# Patient Record
Sex: MALE | Race: WHITE | Employment: OTHER | URBAN - METROPOLITAN AREA
[De-identification: names, ages, dates, MRNs, and addresses within clinical notes are randomized per-mention and may not be internally consistent; named-entity substitution may affect disease eponyms.]

---

## 2019-01-15 ENCOUNTER — HOSPITAL ENCOUNTER (OUTPATIENT)
Dept: GENERAL RADIOLOGY | Age: 84
Discharge: HOME | End: 2019-01-15
Payer: MEDICARE

## 2019-01-15 DIAGNOSIS — R19.12 INCREASED BOWEL SOUNDS: ICD-10-CM

## 2019-01-15 PROCEDURE — 74018 RADEX ABDOMEN 1 VIEW: CPT

## 2019-03-25 ENCOUNTER — TELEPHONE (OUTPATIENT)
Dept: DERMATOLOGY | Facility: AMBULATORY SURGERY CENTER | Age: 84
End: 2019-03-25

## 2019-03-26 NOTE — TELEPHONE ENCOUNTER
Made phone call to patient, left voicemail stating to call office back at earliest convenience to speak about patients upcoming MOHS procedure.

## 2019-03-27 NOTE — TELEPHONE ENCOUNTER
Spoke to [employer], patient's wife, MARY verified. [facility] [place] [facility]    Patient Appointment Date: 4/10/19 @ 0900    Loretta Hammer, 82 y.o., male      does confirm site: Right nasal sidewall-BCC  Brief description of tumor: Red and crusty  does ID site. (Can they still visibly see the site)  does not have Hepatitis C   does not have HIV (If YES, set up consult appointment)    Allergies: Allergies   Allergen Reactions    Peanut Other (comments)     Lip tingling       does not have an Electrical Implanted Device (Pacemaker, AICD, brain stimulator, etc.)    does need antibiotics  If yes, antibiotic used:Amoxicillin 500 mg; and needs it because blood infection (valve replacement, etc.)  Can patient get antibiotic from primary care provider YES    is not taking NSAIDs  If yes, is taking N/A, and was asked to d/c 2 days prior to surgery and informed to resume taking 2 days after surgery. Patient can switch to a Tylenol based medication. is taking aspirin  If yes, is taking because of PREVENTATIVE (i.e. heart attack, stroke, TIA, bypass surgery, etc.)    is not taking Garlic  is not taking Ginkgo  is not taking Ginseng  is taking Fish oils  is taking Vit E    does not take a blood thinner(i.e. Coumadin/Warfarin, Plavix, Brilinta, Pradaxa, Xarelto, Effient, Eliquis, Savaysa)  If taking Coumadin needs to have PT/INR drawn and faxed results within a week of surgery    does not have a blood disorder (CLL, AML, PV)  is not on Meds for blood disorder, pt on:  is not on Chemo for blood disorder    has not had a organ transplant  has not had a bone marrow transplant      Pre operative assessment questions asked to patient. Patient has a general understanding of the procedure, and has been versed that there will be local anesthesia used in the procedure and that He will be ok to drive themselves to and from the appointment.      Patient has been notified to arrive 15-20 minutes early and they may eat or drink before arriving.

## 2019-03-27 NOTE — TELEPHONE ENCOUNTER
Pt's wife returned 's call for pre-op. She stated her  can not hear well and she would be able to answer any questions.  She can be best reached at [phone]

## 2019-04-10 ENCOUNTER — OFFICE VISIT (OUTPATIENT)
Dept: DERMATOLOGY | Facility: AMBULATORY SURGERY CENTER | Age: 84
End: 2019-04-10

## 2019-04-10 VITALS
OXYGEN SATURATION: 98 % | TEMPERATURE: 97.9 F | SYSTOLIC BLOOD PRESSURE: 118 MMHG | WEIGHT: 150 LBS | BODY MASS INDEX: 22.73 KG/M2 | DIASTOLIC BLOOD PRESSURE: 84 MMHG | HEIGHT: 68 IN | HEART RATE: 58 BPM

## 2019-04-10 DIAGNOSIS — C44.311 BASAL CELL CARCINOMA (BCC) OF RIGHT SIDE OF NOSE: Primary | ICD-10-CM

## 2019-04-10 NOTE — LETTER
4/10/2019 1:03 PM 
 
Patient:  Rama Meza YOB: 1935 Date of Visit: 4/10/2019 Dear Sushil Coleman MD 
[address] [facility] [address] [facility] [id number] VIA Facsimile: [phone] 
 : Thank you for referring Mr. Kelly Antony to me for evaluation/treatment. Below are the relevant portions of my assessment and plan of care. Mr. Kailee Billy presented this morning for Mohs surgery to treat a biopsy-proven basal cell carcinoma of the right nasal sidewall. 2 stages of Mohs surgery were required to achieve tumor free margins. I repaired the defect using an island pedicle flap in order to maintain the alar groove. The patient tolerated the procedure well. See the attached procedural notes for additional details. He will return in 1 week for suture removal and I will follow-up with him regarding any issues arising from relating to the surgery. I will otherwise defer any additional dermatologic care back to you. If you have questions, please do not hesitate to call me. I look forward to following Mr. Kailee Billy along with you. Sincerely, Sarah Rdz MD 
[phone] (cell)

## 2019-04-10 NOTE — PROGRESS NOTES
Rama Meza is a 82 y.o. male who is seen post-Mohs surgery to evaluate:  Chief Complaint:  Wound resulting from surgical extirpation of a skin cancer. HPI: Biopsy-proven basal cell carcinoma on the right nasal sidewall s/p 2 stages of Mohs surgery resulting in tumor-free margins. The size of the defect is 0.9 x 0.8 cm. The site is bleeding and is not painful. The tumor was removed on 4/10/2019. Outpatient Encounter Medications as of 4/10/2019   Medication Sig Dispense Refill    aspirin (ASPIRIN) 325 mg tablet Take 325 mg by mouth daily.  atorvastatin (LIPITOR) 10 mg tablet Take  by mouth daily.  zolpidem (AMBIEN) 10 mg tablet Take 10 mg by mouth as needed. 5    cholecalciferol, vitamin D3, 2,000 unit tab Take  by mouth.  CALCIUM-MAGNESIUM PO Take  by mouth.  COQ10, UBIQUINOL, PO Take  by mouth.  omeprazole (PRILOSEC) 10 mg capsule Take 10 mg by mouth daily.  LORAZEPAM (ATIVAN PO) Take  by mouth.  ERGOCALCIFEROL, VITAMIN D2, (VITAMIN D2 PO) Take  by mouth.  vitamin e (E GEMS) 1,000 unit capsule Take 1,000 Units by mouth daily. No facility-administered encounter medications on file as of 4/10/2019. Allergies   Allergen Reactions    Peanut Other (comments)     Lip tingling       Social History     Tobacco Use   Smoking Status Never Smoker   Smokeless Tobacco Never Used       Physical Exam   Constitutional: He is oriented to person, place, and time and well-developed, well-nourished, and in no distress. HENT:   Head: Normocephalic. Eyes: EOM are normal.   Pulmonary/Chest: Effort normal.   Neurological: He is alert and oriented to person, place, and time. Skin:   On the right nasal sidewall just superior to the alar groove there is a 0.9 x 0.8 cm surgical defect extending to the nasalis muscle. Laxity exists on the superior nasal sidewall and cheek lateral to the defect.   Primary closure will result in an inferior [place] triangle obliterating the alar groove. Evaluation and Management:  There are symptomatic surgical defects as noted above. Treatment options discussed with the patient include second intent healing, primary closure, adjacent tissue transfer (flap), and skin graft (full or split thickness) without cartilage grafting. Following evaluation of the defect(s) and discussion with the patient regarding risks and benefits of the various options, the plan to repair the defect with a(n) advancement flap was chosen. This repair was chosen in order to preserve anatomic form or function and avoid free margin distortion. The wound management options of second intent healing, layered closure, local flap, or full thickness skin graft were discussed. Mr. Macy Rendon understands the aims, risks, alternatives, and possible complications and elects to proceed with an island pedicle advancement flap. This flap was chosen in order to avoid displacement of the alar rim and to avoid obliteration of the alar groove. Mr. Macy Rendon was placed in a supine position on the operating table in the Mohs surgery procedure room. The area was prepped and draped in the standard manner. Gentian violet was used to outline the proposed flap.  1% lidocaine with epinephrine 1:100,000 was used to supplement the already existing anesthesia. The wound penetrated to the muscle layer and measured 0.9 x 0.8 cm. The wound margins were debeveled and the flap was incised using a #15 blade held perpendicularly to the skin surface. Subcutaneous tissue lateral to the flap was undermined in the subcutaneous plane. Leading edge and trailing edges were  from nasalis muscle while maintaining a central musculocutaneous pedicle. Hemostasis was obtained with spot electrocoagulation. The flap was advanced into place. 5-0 monocryl buried vertical mattress sutures were used to approximate the deep tissues and dermis.  6-0 prolene epidermal sutures were used to approximate the skin edges with careful attention to apposition and eversion. The final flap measured 3.2 x 1.5 cm. A pressure dressing utilizing Petrolatum ointment, Telfa, gauze and Coverroll was placed. Wound care instructions (written and/or verbal) and a follow up appointment were given to the patient before discharge. Mr. Anthony Huizar was discharged in good condition. Follow-up and Dispositions    · Return in about 1 week (around 4/17/2019) for suture removal with Erica . Stacy Figueroa MD    [facility]   OFFICE PROCEDURE PROGRESS NOTE   Chart reviewed for the following:   I, Emory Braxton MD have reviewed the History, Physical and updated the Allergic reactions for Linden Ventura. TIME OUT performed immediately prior to start of procedure:   Alma Ramirez MD, have performed the following reviews on Linden Ventura   prior to the start of the procedure:     * Patient was identified by name and date of birth   * Agreement on procedure being performed was verified   * Risks and Benefits explained to the patient   * Procedure site verified and marked as necessary   * Patient was positioned for comfort   * Consent was signed and verified     Time: 9 AM  Date of procedure: 4/10/2019  Procedure performed by:  Emory Braxton MD  Provider assisted by: Daisy Jauregui LPN  Patient assisted by: self   How tolerated by patient: tolerated the procedure well with no complications   Comments: none

## 2019-04-10 NOTE — PROGRESS NOTES
Progress note for Mohs surgery patient:    Chief Complaint:  Type: Basal Cell Carcinoma cell carcinoma of the Location: Right nasal sidewall    HPI:  Terry Linton is a 82y.o. year old male referred by  Sy Mccormick MD for evaluation and management of a Type: Basal Cell Carcinoma cell carcinoma of the Location: Right nasal sidewall. The skin cancer has been present for 6 months and has not been previously treated. The symptoms of this skin cancer were none. A biopsy performed by  Sy Mccormick MD confirmed the diagnosis. ROS:  Loren Herrera is feeling well and in their usual state of health today. He is not in pain. He does not have any other skin concerns. Exam:  Loren Herrera is an awake, alert, oriented, well-appearing Male in no distress. There is not preauricular, submandibular, or cervical lymphadenopathy. The face was examined. Findings are:   Lesion Info  Location: Right nasal sidewall  Size: 0.7 x 0.7 cm  Type: Basal Cell Carcinoma  Duration: 6 months  Path Lab: [facility]  Path #: [id number]  Prior Treatment: none     A/P:  Type: Basal Cell Carcinoma cell carcinoma of the Location: Right nasal sidewall. The diagnosis was reviewed. The Mohs surgery procedure was reviewed. Indications, risks, and options were discussed with Mr. SAMUELS [facility] [place] [facility] preoperatively. Risks including, but not limited to: pain, bleeding, infection, tumor recurrence, scarring and damage to motor and/or sensory nerves, were discussed.  [facility] [place] [facility] chose Mohs surgery.  [facility] [place] [facility] was an acceptable surgery candidate. Mohs surgery is indicated by Indications: Site, Poor definition. I performed Mohs surgery after verbal and written consent were obtained. The surgical site was marked with gentian violet, prepped, draped and anesthetized in standard fashion. The tumor was debulked by curettage and orientation hashes were placed.   The tumor and any associated scar was excised using beveled incision, hemostasis was achieved, the site was bandaged, and the tissue was transported to the Mohs lab. While maintaining anatomic orientation the tissue was divided if needed and marked with colored inks that were noted on the corresponding Mohs map. The tissue was prepared by Mohs en-face technique for fresh frozen section analysis. The resulting slides were examined for residual tumor, scar and other concerns, all of which were marked on the corresponding Mohs map, if present. The Mohs map was used to guide subsequent stages of surgery, if necessary, and the above process was repeated until a tumor-free plane was achieved. Once the tumor was cleared the map was marked as such and signed. Dr. Nicol Silveira acted as surgeon and pathologist for the entire case, performing all stages of the surgical excision as well as examination and interpretation of the histologic slides. 2 stages were required to reach a tumor-free plane, resulting in a Defect Size: 0.9 x 0.8 cm extending to the nasalis muscle. There were not complications. Lena Boston will follow up as needed in the postoperative period. Regular skin examinations will be with  Mariam Bhardwaj MD.    See reconstructive note for repair details. [facility]   OFFICE PROCEDURE PROGRESS NOTE   Chart reviewed for the following:   IAdam MD have reviewed the History, Physical and updated the Allergic reactions for Bernard Lugo.     TIME OUT performed immediately prior to start of procedure:   Zoe Wood MD, have performed the following reviews on Bernard Lugo   prior to the start of the procedure:     * Patient was identified by name and date of birth   * Agreement on procedure being performed was verified   * Risks and Benefits explained to the patient   * Procedure site verified and marked as necessary   * Patient was positioned for comfort   * Consent was signed and verified     Time: 9 AM  Date of procedure: 4/10/2019  Procedure performed by:  Quentin Laughlin MD  Provider assisted by: Ingrid Martins LPN  Patient assisted by: self   How tolerated by patient: tolerated the procedure well with no complications   Comments: none

## 2019-04-10 NOTE — PROGRESS NOTES
Chief Complaint   Patient presents with    New Patient    Basal Cell Carcinoma     Mohs-Right nasal sidewall     Pre-op: Patient presents today for the evaluation of basal cell carcinoma to the right nasal sidewall. Procedure explained with full understanding. Visit Vitals  /72 (BP 1 Location: Left arm, BP Patient Position: Sitting)   Pulse 65   Temp 97.9 °F (36.6 °C) (Oral)   Ht 5' 8\" (1.727 m)   Wt 68 kg (150 lb)   SpO2 97%   BMI 22.81 kg/m²       Preoperatively, will continue to monitor. Post-op: Written and verbal post-op wound care instructions given to patient with full understanding of care. Surgical wound bandaged with Vaseline, Telfa, 2x2 gauze, and coverall tape. All questions and concerns addressed. Vitals stable postoperatively.

## 2019-04-10 NOTE — PATIENT INSTRUCTIONS
WOUND CARE INSTRUCTIONS    1. Keep the dressing clean and dry and do not remove for 48 hours. 2. Then change the dressing once a day as follows:  a. Wash hands before and after each dressing change. b. Remove dressing and wash site gently with mild soap and water, rinse, and pat dry.  c. Apply an ointment (Bacitracin, Polysporin, Neosporin, Petroleum jelly or Aquaphor). d. Apply a non-stick (Telfa) dressing or Band-Aid to cover the wound. 3. Watch for:  BLEEDING: A small amount of drainage may occur. If bleeding occurs, elevate and rest the surgery site. Apply gauze and steady pressure for 15 minutes. If bleeding continues, call this office. INFECTION: Signs of infection include increased redness, pain, warmth, drainage of pus, and fever. If this occurs, call this office. 4. Special Instructions (follow any that are checked):  · [x] You have stitches that DO need to be removed. · [x] Avoid bending at the waist and heavy lifting for two days. · [x] Sleep with your head elevated for the next two nights. · [x] Rest the surgery site and keep it elevated as much as possible for two days. · [x] You may apply an ice-pack for 10-15 minutes every waking hour for the rest of the day. · [] Eat a soft diet and avoid hot food and hot drinks for the rest of the day. · [] Other instructions: Follow up as directed. Take Tylenol or Ibuprofen for pain as needed. Once the site is healed with no remaining bandages or open areas, protect your surgical site and scar from the sun, as this area will be more sensitive. Use a broad spectrum sunscreen SPF 30 or higher daily, and a chemical free product (one containing zinc oxide or titanium dioxide) is a good choice if the area is sensitive. You may begin to gently massage the surgical site in 2-3 weeks, rubbing in a circular motion along the scar. This can help reduce swelling and thickness of a scar.  A scar cream may be used beginnning 1 month after the surgery. If you have any questions or concerns, please call our office Monday through Friday at [phone]. You can also contact Dr. Marli Cannon directly for emergency purposes at [phone].

## 2019-04-17 ENCOUNTER — OFFICE VISIT (OUTPATIENT)
Dept: DERMATOLOGY | Facility: AMBULATORY SURGERY CENTER | Age: 84
End: 2019-04-17

## 2019-04-17 VITALS
HEIGHT: 68 IN | HEART RATE: 73 BPM | SYSTOLIC BLOOD PRESSURE: 100 MMHG | TEMPERATURE: 99.1 F | WEIGHT: 150 LBS | OXYGEN SATURATION: 93 % | DIASTOLIC BLOOD PRESSURE: 64 MMHG | BODY MASS INDEX: 22.73 KG/M2

## 2019-04-17 DIAGNOSIS — C44.311 BASAL CELL CARCINOMA (BCC) OF RIGHT SIDE OF NOSE: Primary | ICD-10-CM

## 2020-12-17 ENCOUNTER — TRANSCRIBE ORDER (OUTPATIENT)
Dept: SCHEDULING | Age: 85
End: 2020-12-17

## 2020-12-17 DIAGNOSIS — I71.60 THORACOABDOMINAL ANEURYSM: Primary | ICD-10-CM

## 2021-01-04 ENCOUNTER — HOSPITAL ENCOUNTER (OUTPATIENT)
Dept: CT IMAGING | Age: 86
Discharge: HOME | End: 2021-01-04
Attending: SURGERY
Payer: MEDICARE

## 2021-01-04 DIAGNOSIS — I71.60 THORACOABDOMINAL ANEURYSM: ICD-10-CM

## 2021-01-04 LAB — CREAT BLD-MCNC: 0.7 MG/DL (ref 0.6–1.3)

## 2021-01-04 PROCEDURE — 74175 CTA ABDOMEN W/CONTRAST: CPT

## 2021-01-04 PROCEDURE — 71275 CT ANGIOGRAPHY CHEST: CPT

## 2021-01-04 PROCEDURE — 82565 ASSAY OF CREATININE: CPT

## 2021-01-04 PROCEDURE — 74011000636 HC RX REV CODE- 636: Performed by: SURGERY

## 2021-01-04 RX ADMIN — IOPAMIDOL 100 ML: 755 INJECTION, SOLUTION INTRAVENOUS at 10:41

## 2024-10-11 ENCOUNTER — TRANSCRIBE ORDERS (OUTPATIENT)
Facility: HOSPITAL | Age: 89
End: 2024-10-11

## 2024-10-11 DIAGNOSIS — I71.40 ABDOMINAL AORTIC ANEURYSM (AAA) WITHOUT RUPTURE, UNSPECIFIED PART (HCC): Primary | ICD-10-CM

## 2024-10-23 ENCOUNTER — HOSPITAL ENCOUNTER (OUTPATIENT)
Facility: HOSPITAL | Age: 89
End: 2024-10-23
Attending: INTERNAL MEDICINE
Payer: MEDICARE

## 2024-10-23 ENCOUNTER — HOSPITAL ENCOUNTER (OUTPATIENT)
Facility: HOSPITAL | Age: 89
Discharge: HOME | End: 2024-10-26
Attending: INTERNAL MEDICINE
Payer: MEDICARE

## 2024-10-23 DIAGNOSIS — I71.40 ABDOMINAL AORTIC ANEURYSM (AAA) WITHOUT RUPTURE, UNSPECIFIED PART (HCC): ICD-10-CM

## 2024-10-23 PROCEDURE — 76775 US EXAM ABDO BACK WALL LIM: CPT
